# Patient Record
Sex: MALE
[De-identification: names, ages, dates, MRNs, and addresses within clinical notes are randomized per-mention and may not be internally consistent; named-entity substitution may affect disease eponyms.]

---

## 2020-01-01 ENCOUNTER — NURSE TRIAGE (OUTPATIENT)
Dept: OTHER | Facility: CLINIC | Age: 40
End: 2020-01-01

## 2020-01-01 NOTE — TELEPHONE ENCOUNTER
Reason for Disposition   Closing the mouth and biting down does not feel normal    Protocols used: MOUTH INJURY-ADULT-AH    Caller reports that he fell going up the stairs 10 days ago and his R jaw hit the stair. Since this time he has had pain and swelling. At this time jaw feels numb and he is unable to open and close his mouth normally. Caller will proceed to al ER for further treatment.